# Patient Record
Sex: MALE | Race: WHITE | Employment: FULL TIME | URBAN - METROPOLITAN AREA
[De-identification: names, ages, dates, MRNs, and addresses within clinical notes are randomized per-mention and may not be internally consistent; named-entity substitution may affect disease eponyms.]

---

## 2017-01-13 ENCOUNTER — OFFICE VISIT (OUTPATIENT)
Dept: ENT CLINIC | Age: 37
End: 2017-01-13

## 2017-01-13 VITALS
WEIGHT: 230 LBS | HEART RATE: 103 BPM | SYSTOLIC BLOOD PRESSURE: 130 MMHG | DIASTOLIC BLOOD PRESSURE: 70 MMHG | HEIGHT: 71 IN | BODY MASS INDEX: 32.2 KG/M2

## 2017-01-13 DIAGNOSIS — H60.331 ACUTE SWIMMER'S EAR OF RIGHT SIDE: Primary | ICD-10-CM

## 2017-01-13 DIAGNOSIS — H72.13: ICD-10-CM

## 2017-01-13 DIAGNOSIS — L92.9 GRANULATION TISSUE OF EAR CANAL: ICD-10-CM

## 2017-01-13 DIAGNOSIS — H71.01 CHOLESTEATOMA OF ATTIC OF RIGHT EAR: ICD-10-CM

## 2017-01-13 DIAGNOSIS — H66.13 CHRONIC TUBOTYMPANIC SUPPURATIVE OTITIS MEDIA OF BOTH EARS: ICD-10-CM

## 2017-01-13 PROCEDURE — 99214 OFFICE O/P EST MOD 30 MIN: CPT | Performed by: OTOLARYNGOLOGY

## 2017-01-20 ENCOUNTER — HOSPITAL ENCOUNTER (OUTPATIENT)
Dept: CT IMAGING | Age: 37
Discharge: HOME | End: 2017-01-20
Attending: OTOLARYNGOLOGY | Admitting: OTOLARYNGOLOGY

## 2017-01-20 DIAGNOSIS — L92.9 GRANULATION TISSUE OF EAR CANAL: ICD-10-CM

## 2017-01-20 DIAGNOSIS — H66.13 CHRONIC TUBOTYMPANIC SUPPURATIVE OTITIS MEDIA OF BOTH EARS: ICD-10-CM

## 2017-01-20 DIAGNOSIS — H72.13: ICD-10-CM

## 2017-01-20 DIAGNOSIS — H71.01 CHOLESTEATOMA OF ATTIC OF RIGHT EAR: ICD-10-CM

## 2017-02-07 ENCOUNTER — OFFICE VISIT (OUTPATIENT)
Dept: ENT CLINIC | Age: 37
End: 2017-02-07

## 2017-02-07 VITALS
WEIGHT: 228.6 LBS | HEART RATE: 73 BPM | SYSTOLIC BLOOD PRESSURE: 123 MMHG | BODY MASS INDEX: 31.88 KG/M2 | DIASTOLIC BLOOD PRESSURE: 82 MMHG

## 2017-02-07 DIAGNOSIS — H66.13 CHRONIC TUBOTYMPANIC SUPPURATIVE OTITIS MEDIA OF BOTH EARS: ICD-10-CM

## 2017-02-07 DIAGNOSIS — L92.9 GRANULATION TISSUE OF EAR CANAL: ICD-10-CM

## 2017-02-07 DIAGNOSIS — H91.93 HEARING LOSS, BILATERAL: ICD-10-CM

## 2017-02-07 DIAGNOSIS — H71.01 CHOLESTEATOMA OF ATTIC OF RIGHT EAR: Primary | ICD-10-CM

## 2017-02-07 DIAGNOSIS — H71.21 CHOLESTEATOMA OF MASTOID CAVITY, RIGHT: ICD-10-CM

## 2017-02-07 PROBLEM — H71.20: Status: ACTIVE | Noted: 2017-02-07

## 2017-02-07 PROCEDURE — 99214 OFFICE O/P EST MOD 30 MIN: CPT | Performed by: OTOLARYNGOLOGY

## 2017-02-17 ENCOUNTER — TELEPHONE (OUTPATIENT)
Dept: ENT CLINIC | Age: 37
End: 2017-02-17

## 2017-03-01 ENCOUNTER — TELEPHONE (OUTPATIENT)
Dept: ENT CLINIC | Age: 37
End: 2017-03-01

## 2017-03-10 ENCOUNTER — TELEPHONE (OUTPATIENT)
Dept: ENT CLINIC | Age: 37
End: 2017-03-10

## 2017-03-30 ENCOUNTER — HOSPITAL ENCOUNTER (OUTPATIENT)
Dept: SURGERY | Age: 37
Discharge: HOME | End: 2017-03-30
Attending: OTOLARYNGOLOGY | Admitting: OTOLARYNGOLOGY

## 2017-03-30 VITALS
HEIGHT: 71 IN | TEMPERATURE: 97 F | RESPIRATION RATE: 17 BRPM | HEART RATE: 100 BPM | OXYGEN SATURATION: 97 % | DIASTOLIC BLOOD PRESSURE: 83 MMHG | BODY MASS INDEX: 31.2 KG/M2 | SYSTOLIC BLOOD PRESSURE: 145 MMHG | WEIGHT: 222.88 LBS

## 2017-03-30 LAB
ANION GAP SERPL CALCULATED.3IONS-SCNC: 16 MMOL/L (ref 3–16)
BUN BLDV-MCNC: 14 MG/DL (ref 7–20)
CALCIUM SERPL-MCNC: 9.1 MG/DL (ref 8.3–10.6)
CHLORIDE BLD-SCNC: 98 MMOL/L (ref 99–110)
CO2: 24 MMOL/L (ref 21–32)
CREAT SERPL-MCNC: 0.6 MG/DL (ref 0.9–1.3)
GFR AFRICAN AMERICAN: >60
GFR NON-AFRICAN AMERICAN: >60
GLUCOSE BLD-MCNC: 99 MG/DL (ref 70–99)
HCT VFR BLD CALC: 47.7 % (ref 40.5–52.5)
HEMOGLOBIN: 16.4 G/DL (ref 13.5–17.5)
MCH RBC QN AUTO: 30.5 PG (ref 26–34)
MCHC RBC AUTO-ENTMCNC: 34.4 G/DL (ref 31–36)
MCV RBC AUTO: 88.8 FL (ref 80–100)
PDW BLD-RTO: 12.9 % (ref 12.4–15.4)
PLATELET # BLD: 187 K/UL (ref 135–450)
PMV BLD AUTO: 8.7 FL (ref 5–10.5)
POTASSIUM SERPL-SCNC: 4 MMOL/L (ref 3.5–5.1)
RBC # BLD: 5.37 M/UL (ref 4.2–5.9)
SODIUM BLD-SCNC: 138 MMOL/L (ref 136–145)
WBC # BLD: 7.5 K/UL (ref 4–11)

## 2017-03-30 PROCEDURE — 93010 ELECTROCARDIOGRAM REPORT: CPT | Performed by: INTERNAL MEDICINE

## 2017-03-30 PROCEDURE — 69641 REVISE MIDDLE EAR & MASTOID: CPT | Performed by: OTOLARYNGOLOGY

## 2017-03-30 RX ORDER — SODIUM CHLORIDE 9 MG/ML
INJECTION, SOLUTION INTRAVENOUS CONTINUOUS
Status: DISCONTINUED | OUTPATIENT
Start: 2017-03-30 | End: 2017-03-31 | Stop reason: HOSPADM

## 2017-03-30 RX ORDER — SODIUM CHLORIDE, SODIUM LACTATE, POTASSIUM CHLORIDE, CALCIUM CHLORIDE 600; 310; 30; 20 MG/100ML; MG/100ML; MG/100ML; MG/100ML
INJECTION, SOLUTION INTRAVENOUS CONTINUOUS
Status: DISCONTINUED | OUTPATIENT
Start: 2017-03-30 | End: 2017-03-31 | Stop reason: HOSPADM

## 2017-03-30 RX ORDER — FENTANYL CITRATE 50 UG/ML
25 INJECTION, SOLUTION INTRAMUSCULAR; INTRAVENOUS EVERY 5 MIN PRN
Status: DISCONTINUED | OUTPATIENT
Start: 2017-03-30 | End: 2017-03-31 | Stop reason: HOSPADM

## 2017-03-30 RX ORDER — CEFUROXIME AXETIL 250 MG/1
250 TABLET ORAL 2 TIMES DAILY
Qty: 20 TABLET | Refills: 0 | Status: SHIPPED | OUTPATIENT
Start: 2017-03-30 | End: 2017-04-09

## 2017-03-30 RX ORDER — ONDANSETRON 2 MG/ML
4 INJECTION INTRAMUSCULAR; INTRAVENOUS
Status: COMPLETED | OUTPATIENT
Start: 2017-03-30 | End: 2017-03-30

## 2017-03-30 RX ORDER — HYDROCODONE BITARTRATE AND ACETAMINOPHEN 5; 325 MG/1; MG/1
TABLET ORAL
Qty: 80 TABLET | Refills: 0 | Status: SHIPPED | OUTPATIENT
Start: 2017-03-30 | End: 2017-04-17

## 2017-03-30 RX ORDER — LIDOCAINE HYDROCHLORIDE 10 MG/ML
0.5 INJECTION, SOLUTION EPIDURAL; INFILTRATION; INTRACAUDAL; PERINEURAL ONCE
Status: DISCONTINUED | OUTPATIENT
Start: 2017-03-30 | End: 2017-03-31 | Stop reason: HOSPADM

## 2017-03-30 RX ORDER — CEFAZOLIN SODIUM 2 G/100ML
2 INJECTION, SOLUTION INTRAVENOUS
Status: COMPLETED | OUTPATIENT
Start: 2017-03-30 | End: 2017-03-30

## 2017-03-30 RX ORDER — PROMETHAZINE HYDROCHLORIDE 25 MG/1
25 TABLET ORAL EVERY 6 HOURS PRN
Qty: 40 TABLET | Refills: 0 | Status: SHIPPED | OUTPATIENT
Start: 2017-03-30 | End: 2017-04-17

## 2017-03-30 RX ORDER — SODIUM CHLORIDE 0.9 % (FLUSH) 0.9 %
10 SYRINGE (ML) INJECTION PRN
Status: DISCONTINUED | OUTPATIENT
Start: 2017-03-30 | End: 2017-03-31 | Stop reason: HOSPADM

## 2017-03-30 RX ORDER — MORPHINE SULFATE 2 MG/ML
1 INJECTION, SOLUTION INTRAMUSCULAR; INTRAVENOUS EVERY 5 MIN PRN
Status: DISCONTINUED | OUTPATIENT
Start: 2017-03-30 | End: 2017-03-31 | Stop reason: HOSPADM

## 2017-03-30 RX ORDER — LABETALOL HYDROCHLORIDE 5 MG/ML
5 INJECTION, SOLUTION INTRAVENOUS ONCE
Status: COMPLETED | OUTPATIENT
Start: 2017-03-30 | End: 2017-03-30

## 2017-03-30 RX ORDER — HYDROMORPHONE HCL 110MG/55ML
0.5 PATIENT CONTROLLED ANALGESIA SYRINGE INTRAVENOUS EVERY 5 MIN PRN
Status: DISCONTINUED | OUTPATIENT
Start: 2017-03-30 | End: 2017-03-31 | Stop reason: HOSPADM

## 2017-03-30 RX ORDER — DIPHENHYDRAMINE HYDROCHLORIDE 50 MG/ML
25 INJECTION INTRAMUSCULAR; INTRAVENOUS PRN
Status: DISCONTINUED | OUTPATIENT
Start: 2017-03-30 | End: 2017-03-31 | Stop reason: HOSPADM

## 2017-03-30 RX ORDER — LABETALOL HYDROCHLORIDE 5 MG/ML
INJECTION, SOLUTION INTRAVENOUS
Status: DISCONTINUED
Start: 2017-03-30 | End: 2017-03-31 | Stop reason: HOSPADM

## 2017-03-30 RX ORDER — SODIUM CHLORIDE 0.9 % (FLUSH) 0.9 %
10 SYRINGE (ML) INJECTION EVERY 12 HOURS SCHEDULED
Status: DISCONTINUED | OUTPATIENT
Start: 2017-03-30 | End: 2017-03-31 | Stop reason: HOSPADM

## 2017-03-30 RX ADMIN — Medication 0.5 MG: at 14:50

## 2017-03-30 RX ADMIN — ONDANSETRON 4 MG: 2 INJECTION INTRAMUSCULAR; INTRAVENOUS at 16:13

## 2017-03-30 RX ADMIN — Medication 0.5 MG: at 14:26

## 2017-03-30 RX ADMIN — LABETALOL HYDROCHLORIDE 5 MG: 5 INJECTION, SOLUTION INTRAVENOUS at 15:15

## 2017-03-30 RX ADMIN — CEFAZOLIN SODIUM 2 G: 2 INJECTION, SOLUTION INTRAVENOUS at 07:26

## 2017-03-30 RX ADMIN — FENTANYL CITRATE 25 MCG: 50 INJECTION, SOLUTION INTRAMUSCULAR; INTRAVENOUS at 15:05

## 2017-03-30 ASSESSMENT — PAIN DESCRIPTION - ORIENTATION
ORIENTATION: RIGHT

## 2017-03-30 ASSESSMENT — PAIN SCALES - GENERAL
PAINLEVEL_OUTOF10: 4
PAINLEVEL_OUTOF10: 8
PAINLEVEL_OUTOF10: 5
PAINLEVEL_OUTOF10: 3
PAINLEVEL_OUTOF10: 7

## 2017-03-30 ASSESSMENT — PAIN DESCRIPTION - PAIN TYPE
TYPE: SURGICAL PAIN

## 2017-03-30 ASSESSMENT — PAIN DESCRIPTION - LOCATION
LOCATION: EAR

## 2017-03-30 ASSESSMENT — PAIN - FUNCTIONAL ASSESSMENT: PAIN_FUNCTIONAL_ASSESSMENT: 0-10

## 2017-03-31 LAB
EKG ATRIAL RATE: 95 BPM
EKG DIAGNOSIS: NORMAL
EKG P AXIS: 37 DEGREES
EKG P-R INTERVAL: 148 MS
EKG Q-T INTERVAL: 362 MS
EKG QRS DURATION: 88 MS
EKG QTC CALCULATION (BAZETT): 454 MS
EKG R AXIS: -29 DEGREES
EKG T AXIS: 19 DEGREES
EKG VENTRICULAR RATE: 95 BPM

## 2017-04-05 ENCOUNTER — OFFICE VISIT (OUTPATIENT)
Dept: ENT CLINIC | Age: 37
End: 2017-04-05

## 2017-04-05 VITALS
BODY MASS INDEX: 31.08 KG/M2 | SYSTOLIC BLOOD PRESSURE: 119 MMHG | WEIGHT: 222 LBS | HEIGHT: 71 IN | DIASTOLIC BLOOD PRESSURE: 78 MMHG | HEART RATE: 89 BPM

## 2017-04-05 DIAGNOSIS — Z09 POSTOP CHECK: Primary | ICD-10-CM

## 2017-04-05 PROCEDURE — 99024 POSTOP FOLLOW-UP VISIT: CPT | Performed by: OTOLARYNGOLOGY

## 2017-04-07 ENCOUNTER — OFFICE VISIT (OUTPATIENT)
Dept: ENT CLINIC | Age: 37
End: 2017-04-07

## 2017-04-07 VITALS
WEIGHT: 228.1 LBS | BODY MASS INDEX: 31.81 KG/M2 | SYSTOLIC BLOOD PRESSURE: 127 MMHG | DIASTOLIC BLOOD PRESSURE: 78 MMHG | HEART RATE: 83 BPM

## 2017-04-07 DIAGNOSIS — Z09 POSTOP CHECK: Primary | ICD-10-CM

## 2017-04-07 PROCEDURE — 99024 POSTOP FOLLOW-UP VISIT: CPT | Performed by: OTOLARYNGOLOGY

## 2017-04-07 RX ORDER — CIPROFLOXACIN AND DEXAMETHASONE 3; 1 MG/ML; MG/ML
6 SUSPENSION/ DROPS AURICULAR (OTIC) 2 TIMES DAILY
Qty: 1 BOTTLE | Refills: 1 | Status: SHIPPED | OUTPATIENT
Start: 2017-04-07 | End: 2018-06-15 | Stop reason: SDUPTHER

## 2017-04-11 ENCOUNTER — TELEPHONE (OUTPATIENT)
Dept: ENT CLINIC | Age: 37
End: 2017-04-11

## 2017-04-17 ENCOUNTER — OFFICE VISIT (OUTPATIENT)
Dept: ENT CLINIC | Age: 37
End: 2017-04-17

## 2017-04-17 VITALS
WEIGHT: 228 LBS | BODY MASS INDEX: 31.92 KG/M2 | HEIGHT: 71 IN | HEART RATE: 88 BPM | SYSTOLIC BLOOD PRESSURE: 127 MMHG | DIASTOLIC BLOOD PRESSURE: 74 MMHG

## 2017-04-17 DIAGNOSIS — Z09 POSTOP CHECK: Primary | ICD-10-CM

## 2017-04-17 PROCEDURE — 99024 POSTOP FOLLOW-UP VISIT: CPT | Performed by: OTOLARYNGOLOGY

## 2017-04-28 ENCOUNTER — OFFICE VISIT (OUTPATIENT)
Dept: ENT CLINIC | Age: 37
End: 2017-04-28

## 2017-04-28 VITALS
BODY MASS INDEX: 31.92 KG/M2 | DIASTOLIC BLOOD PRESSURE: 72 MMHG | HEART RATE: 87 BPM | WEIGHT: 228 LBS | SYSTOLIC BLOOD PRESSURE: 126 MMHG | HEIGHT: 71 IN

## 2017-04-28 DIAGNOSIS — Z09 POSTOP CHECK: Primary | ICD-10-CM

## 2017-04-28 PROCEDURE — 99024 POSTOP FOLLOW-UP VISIT: CPT | Performed by: OTOLARYNGOLOGY

## 2017-05-12 ENCOUNTER — OFFICE VISIT (OUTPATIENT)
Dept: ENT CLINIC | Age: 37
End: 2017-05-12

## 2017-05-12 VITALS
HEIGHT: 71 IN | BODY MASS INDEX: 31.92 KG/M2 | HEART RATE: 84 BPM | WEIGHT: 228 LBS | SYSTOLIC BLOOD PRESSURE: 125 MMHG | DIASTOLIC BLOOD PRESSURE: 72 MMHG

## 2017-05-12 DIAGNOSIS — Z09 POSTOP CHECK: Primary | ICD-10-CM

## 2017-05-12 PROCEDURE — 99024 POSTOP FOLLOW-UP VISIT: CPT | Performed by: OTOLARYNGOLOGY

## 2017-08-11 ENCOUNTER — OFFICE VISIT (OUTPATIENT)
Dept: ENT CLINIC | Age: 37
End: 2017-08-11

## 2017-08-11 VITALS
HEART RATE: 105 BPM | HEIGHT: 71 IN | WEIGHT: 226.9 LBS | DIASTOLIC BLOOD PRESSURE: 71 MMHG | SYSTOLIC BLOOD PRESSURE: 123 MMHG | BODY MASS INDEX: 31.77 KG/M2

## 2017-08-11 DIAGNOSIS — H71.21 CHOLESTEATOMA OF MASTOID CAVITY, RIGHT: ICD-10-CM

## 2017-08-11 DIAGNOSIS — H95.191 ENCOUNTER FOR MASTOIDECTOMY CAVITY DEBRIDEMENT, RIGHT: ICD-10-CM

## 2017-08-11 DIAGNOSIS — H71.01 CHOLESTEATOMA OF ATTIC OF RIGHT EAR: Primary | ICD-10-CM

## 2017-08-11 PROCEDURE — 69220 CLEAN OUT MASTOID CAVITY: CPT | Performed by: OTOLARYNGOLOGY

## 2017-12-08 ENCOUNTER — OFFICE VISIT (OUTPATIENT)
Dept: ENT CLINIC | Age: 37
End: 2017-12-08

## 2017-12-08 VITALS
BODY MASS INDEX: 32.03 KG/M2 | DIASTOLIC BLOOD PRESSURE: 90 MMHG | HEART RATE: 90 BPM | HEIGHT: 71 IN | SYSTOLIC BLOOD PRESSURE: 144 MMHG | WEIGHT: 228.8 LBS

## 2017-12-08 DIAGNOSIS — H95.191 ENCOUNTER FOR MASTOIDECTOMY CAVITY DEBRIDEMENT, RIGHT: ICD-10-CM

## 2017-12-08 DIAGNOSIS — H72.12 ATTIC PERFORATION OF TYMPANIC MEMBRANE OF LEFT EAR: ICD-10-CM

## 2017-12-08 DIAGNOSIS — H66.12 CHRONIC TUBOTYMPANIC SUPPURATIVE OTITIS MEDIA OF LEFT EAR: ICD-10-CM

## 2017-12-08 DIAGNOSIS — H70.11 CHRONIC MASTOIDITIS OF RIGHT SIDE: Primary | ICD-10-CM

## 2017-12-08 PROCEDURE — 69220 CLEAN OUT MASTOID CAVITY: CPT | Performed by: OTOLARYNGOLOGY

## 2017-12-14 NOTE — PROGRESS NOTES
[address] [place] [address] ENT      HISTORY:  Hannah Hendrix returns today for mastoid bowl cleaning. He stated he has had no recent ear pain or drainage. His hearing is as usual.        PROCEDURE (39920):  Debridement of the right mastoid cavity, was performed under otomicroscopic visualization, removing copious squamous debris and cerumen impaction. There is no evidence of recurrent cholesteatoma. On the left, there was a severe attic retraction pocket with scutum erosion, exposing the head of the malleus. The incus appeared to be eroded and absent. There was no evidence of cholesteatoma. IMPRESSION / Daryl Huffman / Noé Narvaez / PROCEDURES:       Hannah Hendrix was seen today for ear problem. Diagnoses and all orders for this visit:    Chronic mastoiditis of right side    Encounter for mastoidectomy cavity debridement, right    Chronic tubotympanic suppurative otitis media of left ear    Attic perforation of tympanic membrane of left ear      RECOMMENDATIONS / PLAN:    Return in about 6 months (around 6/8/2018) for mastoid debridment, recheck/follow-up, and sooner if condition worsens.

## 2018-06-12 ENCOUNTER — TELEPHONE (OUTPATIENT)
Dept: ENT CLINIC | Age: 38
End: 2018-06-12

## 2018-06-15 ENCOUNTER — OFFICE VISIT (OUTPATIENT)
Dept: ENT CLINIC | Age: 38
End: 2018-06-15

## 2018-06-15 VITALS
HEART RATE: 75 BPM | SYSTOLIC BLOOD PRESSURE: 120 MMHG | WEIGHT: 225 LBS | DIASTOLIC BLOOD PRESSURE: 83 MMHG | BODY MASS INDEX: 31.38 KG/M2

## 2018-06-15 DIAGNOSIS — H60.332 ACUTE SWIMMER'S EAR OF LEFT SIDE: ICD-10-CM

## 2018-06-15 DIAGNOSIS — H66.012 ACUTE SUPPURATIVE OTITIS MEDIA OF LEFT EAR WITH SPONTANEOUS RUPTURE OF TYMPANIC MEMBRANE, RECURRENCE NOT SPECIFIED: Primary | ICD-10-CM

## 2018-06-15 PROCEDURE — 99214 OFFICE O/P EST MOD 30 MIN: CPT | Performed by: OTOLARYNGOLOGY

## 2018-06-15 PROCEDURE — 92504 EAR MICROSCOPY EXAMINATION: CPT | Performed by: OTOLARYNGOLOGY

## 2018-06-15 RX ORDER — CIPROFLOXACIN AND DEXAMETHASONE 3; 1 MG/ML; MG/ML
4 SUSPENSION/ DROPS AURICULAR (OTIC) 2 TIMES DAILY
Qty: 7.5 ML | Refills: 0 | COMMUNITY
Start: 2018-06-15 | End: 2018-06-25

## 2018-06-15 RX ORDER — AMOXICILLIN AND CLAVULANATE POTASSIUM 875; 125 MG/1; MG/1
1 TABLET, FILM COATED ORAL 2 TIMES DAILY
Qty: 20 TABLET | Refills: 0 | Status: SHIPPED | OUTPATIENT
Start: 2018-06-15 | End: 2018-06-25

## 2018-07-10 ENCOUNTER — OFFICE VISIT (OUTPATIENT)
Dept: ENT CLINIC | Age: 38
End: 2018-07-10

## 2018-07-10 VITALS
BODY MASS INDEX: 32.06 KG/M2 | WEIGHT: 229 LBS | HEART RATE: 97 BPM | DIASTOLIC BLOOD PRESSURE: 82 MMHG | SYSTOLIC BLOOD PRESSURE: 132 MMHG | HEIGHT: 71 IN

## 2018-07-10 DIAGNOSIS — H60.332 ACUTE SWIMMER'S EAR OF LEFT SIDE: ICD-10-CM

## 2018-07-10 DIAGNOSIS — H66.13 CHRONIC TUBOTYMPANIC SUPPURATIVE OTITIS MEDIA OF BOTH EARS: ICD-10-CM

## 2018-07-10 DIAGNOSIS — H72.12 ATTIC PERFORATION OF TYMPANIC MEMBRANE OF LEFT EAR: ICD-10-CM

## 2018-07-10 DIAGNOSIS — H66.012 ACUTE SUPPURATIVE OTITIS MEDIA OF LEFT EAR WITH SPONTANEOUS RUPTURE OF TYMPANIC MEMBRANE, RECURRENCE NOT SPECIFIED: Primary | ICD-10-CM

## 2018-07-10 DIAGNOSIS — H70.11 CHRONIC MASTOIDITIS OF RIGHT SIDE: ICD-10-CM

## 2018-07-10 DIAGNOSIS — H95.191 POST MASTOIDECTOMY SEQUELAE, RIGHT: ICD-10-CM

## 2018-07-10 PROCEDURE — 99213 OFFICE O/P EST LOW 20 MIN: CPT | Performed by: OTOLARYNGOLOGY

## 2018-07-10 PROCEDURE — 92504 EAR MICROSCOPY EXAMINATION: CPT | Performed by: OTOLARYNGOLOGY

## 2019-01-11 ENCOUNTER — OFFICE VISIT (OUTPATIENT)
Dept: ENT CLINIC | Age: 39
End: 2019-01-11
Payer: COMMERCIAL

## 2019-01-11 VITALS
HEART RATE: 70 BPM | SYSTOLIC BLOOD PRESSURE: 118 MMHG | WEIGHT: 221 LBS | BODY MASS INDEX: 30.94 KG/M2 | DIASTOLIC BLOOD PRESSURE: 78 MMHG | HEIGHT: 71 IN

## 2019-01-11 DIAGNOSIS — H70.11 CHRONIC MASTOIDITIS OF RIGHT SIDE: ICD-10-CM

## 2019-01-11 DIAGNOSIS — H95.191 POST MASTOIDECTOMY SEQUELAE, RIGHT: ICD-10-CM

## 2019-01-11 DIAGNOSIS — Z86.69 HISTORY OF CHOLESTEATOMA: ICD-10-CM

## 2019-01-11 DIAGNOSIS — H95.191 ENCOUNTER FOR MASTOIDECTOMY CAVITY DEBRIDEMENT, RIGHT: ICD-10-CM

## 2019-01-11 PROCEDURE — 69220 CLEAN OUT MASTOID CAVITY: CPT | Performed by: OTOLARYNGOLOGY

## 2019-07-12 ENCOUNTER — OFFICE VISIT (OUTPATIENT)
Dept: ENT CLINIC | Age: 39
End: 2019-07-12
Payer: COMMERCIAL

## 2019-07-12 VITALS
DIASTOLIC BLOOD PRESSURE: 77 MMHG | BODY MASS INDEX: 31.52 KG/M2 | WEIGHT: 226 LBS | HEART RATE: 69 BPM | SYSTOLIC BLOOD PRESSURE: 116 MMHG

## 2019-07-12 DIAGNOSIS — H70.11 CHRONIC MASTOIDITIS OF RIGHT SIDE: ICD-10-CM

## 2019-07-12 DIAGNOSIS — H95.191 ENCOUNTER FOR MASTOIDECTOMY CAVITY DEBRIDEMENT, RIGHT: ICD-10-CM

## 2019-07-12 DIAGNOSIS — H61.21 IMPACTED CERUMEN OF RIGHT EAR: ICD-10-CM

## 2019-07-12 DIAGNOSIS — Z86.69 HISTORY OF CHOLESTEATOMA: ICD-10-CM

## 2019-07-12 PROCEDURE — 69220 CLEAN OUT MASTOID CAVITY: CPT | Performed by: OTOLARYNGOLOGY

## 2020-01-13 ENCOUNTER — OFFICE VISIT (OUTPATIENT)
Dept: ENT CLINIC | Age: 40
End: 2020-01-13
Payer: COMMERCIAL

## 2020-01-13 VITALS
SYSTOLIC BLOOD PRESSURE: 137 MMHG | BODY MASS INDEX: 30.85 KG/M2 | DIASTOLIC BLOOD PRESSURE: 82 MMHG | WEIGHT: 220.4 LBS | HEIGHT: 71 IN | HEART RATE: 68 BPM

## 2020-01-13 PROCEDURE — 69220 CLEAN OUT MASTOID CAVITY: CPT | Performed by: OTOLARYNGOLOGY

## 2020-01-13 PROCEDURE — 69210 REMOVE IMPACTED EAR WAX UNI: CPT | Performed by: OTOLARYNGOLOGY

## 2020-03-13 ENCOUNTER — OFFICE VISIT (OUTPATIENT)
Dept: ENT CLINIC | Age: 40
End: 2020-03-13
Payer: COMMERCIAL

## 2020-03-13 VITALS
SYSTOLIC BLOOD PRESSURE: 119 MMHG | BODY MASS INDEX: 30.8 KG/M2 | DIASTOLIC BLOOD PRESSURE: 80 MMHG | WEIGHT: 220 LBS | HEART RATE: 64 BPM | HEIGHT: 71 IN

## 2020-03-13 PROCEDURE — 99214 OFFICE O/P EST MOD 30 MIN: CPT | Performed by: OTOLARYNGOLOGY

## 2020-03-13 RX ORDER — AZITHROMYCIN 250 MG/1
TABLET, FILM COATED ORAL
Qty: 1 PACKET | Refills: 0 | Status: SHIPPED | OUTPATIENT
Start: 2020-03-13 | End: 2020-07-14 | Stop reason: ALTCHOICE

## 2020-03-13 NOTE — PROGRESS NOTES
tracheal position. PALPATION OF LYMPH NODES, CERVICAL, FACIAL AND SUPRACLAVICULAR:  No lymphadenopathy. IMPRESSION / Jenny Frida / Deysi Maurice / PROCEDURES:       Mike Martines was seen today for pharyngitis and sinusitis. Diagnoses and all orders for this visit:    Acute tonsillitis, unspecified etiology  -     azithromycin (ZITHROMAX Z-MARIAJOSE) 250 MG tablet; Take, by mouth, two tablets on day 1, then 1 tablet on day 2 to 5. Acute non-recurrent maxillary sinusitis  -     azithromycin (ZITHROMAX Z-MARIAJOSE) 250 MG tablet; Take, by mouth, two tablets on day 1, then 1 tablet on day 2 to 5. RECOMMENDATIONS / PLAN:      Patient Instructions   RHINOSINUSITIS CARE  · Take Probiotic while you are taking antibiotics, to prevent diarrhea, stomach upset, pseudomembranous colitis, and C. difficile diarrhea. This may be obtained at your pharmacy or health food store. Alternatively, you may eat one cup of yogurt with active or live cultures twice daily while taking the antibiotic. Continue for two to three days after completion of the antibiotic. · Use a 12 hour decongestant spray, 0.05% oxymetazoline (e.g. Afrin, Duration, 4-Way). Spray each nostril twice three times a day for three days, then two times a day for 2 days, and then stop for two days and then repeat the cycle once. · Take one Mucinex-D (red orange box) maximum strength tablet each morning and one Mucinex (blue box) maximum strength tablet each evening, about 12 hours later, daily for the next ten days. Take only is approved by your PCP regarding high blood pressure. · It may take several days to several weeks for your sinusitis to clear up. It is important to take all your medications as prescribed. Please continue your antibiotics as prescribed.     · Please call the office if your condition worsens or if symptoms persist after treatment is completed.        ===================================================================      ADVERSE AND SIDE EFFECTS OF MEDICATIONS:    Please be aware of the following possible adverse reactions, side effects, and complications of the following medications, including, but not limited to: allergic reaction, interactions with other medications, nausea, headache, diarrhea, persistent symptoms, failure to improve, and the following:     Z-kyle (azithromycin)  (antibiotic):  diarrhea, colitis (severe infection and inflammation of the large intestine), pseudomembranous colitis (severe infection and inflammation of the colon, usually due to C. difficile bacteria)  yeast or fungal  infections, Rivas-Roger syndrome (very rare necrotic skin reaction with peeling of skin, blisters and arthritis), persistent symptoms/infection, and failure to improve.       ~~~>>> Also please read all information given to you by the pharmacist.           Follow-up  Return if symptoms worsen or fail to clear 10 days after staring Z-kyle, or on 7/13/20..        >>> Please note that this note was completed using Dragon voice recognition transcription. Every effort was made to ensure accuracy; however, inadvertent  transcription errors may be present despite my best efforts to edit errors.

## 2020-07-14 ENCOUNTER — OFFICE VISIT (OUTPATIENT)
Dept: ENT CLINIC | Age: 40
End: 2020-07-14
Payer: COMMERCIAL

## 2020-07-14 VITALS
HEART RATE: 78 BPM | WEIGHT: 220 LBS | TEMPERATURE: 98 F | HEIGHT: 71 IN | BODY MASS INDEX: 30.8 KG/M2 | SYSTOLIC BLOOD PRESSURE: 130 MMHG | DIASTOLIC BLOOD PRESSURE: 88 MMHG

## 2020-07-14 PROCEDURE — 69220 CLEAN OUT MASTOID CAVITY: CPT | Performed by: OTOLARYNGOLOGY

## 2020-07-14 NOTE — PROGRESS NOTES
No issues. [address] [place] [address] ENT    HISTORY:  Pantera Romo stated that the right ear is plugged up and needs a mastoid debridement. The left ear is doing well with no problems. There is no pain or drainage from either ear. PROCEDURE ([id number]):  Debridement of the right mastoid cavity, was performed under otomicroscopic visualization, removing copious squamous debris and cerumen impaction, using a Cox suction, alligator forceps, and wire loop. He stated that the debrided ear felt back to usual.      Mild non-obstructive cerumen impaction was removed from the left EAC with Billeau wire loop. The left TM was dull, thick, opaque and nonerythematous. The attic retraction pocket was clear with no cholesteatoma. The left EAC was otherwise normal.          IMPRESSION / Demetrin Goodell / Dani Gallagher / PROCEDURES:       Diagnoses and all orders for this visit:    Chronic mastoiditis of right side    Encounter for mastoidectomy cavity debridement, right    Impacted cerumen of left ear    Post mastoidectomy sequelae, right    History of cholesteatoma         RECOMMENDATIONS / PLAN:   1. Keep ears dry. Water precautions reviewed. 2. Return in about 6 months (around 1/14/2021) for recheck/follow-up, mastoid debridement, and sooner if condition worsens.

## 2021-01-14 ENCOUNTER — OFFICE VISIT (OUTPATIENT)
Dept: ENT CLINIC | Age: 41
End: 2021-01-14
Payer: COMMERCIAL

## 2021-01-14 VITALS
TEMPERATURE: 97.5 F | SYSTOLIC BLOOD PRESSURE: 141 MMHG | RESPIRATION RATE: 16 BRPM | DIASTOLIC BLOOD PRESSURE: 90 MMHG | WEIGHT: 228 LBS | HEIGHT: 71 IN | HEART RATE: 97 BPM | BODY MASS INDEX: 31.92 KG/M2

## 2021-01-14 DIAGNOSIS — H72.92 PERFORATION OF LEFT TYMPANIC MEMBRANE: ICD-10-CM

## 2021-01-14 DIAGNOSIS — H66.13 CHRONIC TUBOTYMPANIC SUPPURATIVE OTITIS MEDIA OF BOTH EARS: Chronic | ICD-10-CM

## 2021-01-14 DIAGNOSIS — H95.191 ENCOUNTER FOR MASTOIDECTOMY CAVITY DEBRIDEMENT, RIGHT: ICD-10-CM

## 2021-01-14 DIAGNOSIS — H90.A12 CONDUCTIVE HEARING LOSS OF LEFT EAR WITH RESTRICTED HEARING OF RIGHT EAR: ICD-10-CM

## 2021-01-14 DIAGNOSIS — H95.191 POST MASTOIDECTOMY SEQUELAE, RIGHT: ICD-10-CM

## 2021-01-14 DIAGNOSIS — H61.22 IMPACTED CERUMEN OF LEFT EAR: ICD-10-CM

## 2021-01-14 DIAGNOSIS — H70.11 CHRONIC MASTOIDITIS OF RIGHT SIDE: Primary | ICD-10-CM

## 2021-01-14 PROCEDURE — 69210 REMOVE IMPACTED EAR WAX UNI: CPT | Performed by: OTOLARYNGOLOGY

## 2021-01-14 PROCEDURE — 69220 CLEAN OUT MASTOID CAVITY: CPT | Performed by: OTOLARYNGOLOGY

## 2021-01-14 NOTE — PROGRESS NOTES
[facility] ENT    HISTORY  Timoteo Jacob stated that the right mastoid bowl cleaning. He thinks the left ear also needs cleaning. He wears a hearing aid in the right ear but still does not hear well in that ear. He wears a hearing aid in left ear which helps. PROCEDURE #1 - DEBRIDEMENT RIGHT MASTOID CAVITY ([id number])  Debridement of the right mastoid cavity, was performed under otomicroscopic visualization, removing copious squamous debris and cerumen impaction. There were postsurgical changes of the right ear consistent with modified radical mastoidectomy. Tympanic membrane appeared to be intact. PROCEDURE - REMOVAL OF LEFT CERUMEN IMPACTION ([id number])  Obstructing cerumen impaction occluding the left EAC, and obscuring visualization of the TM, was removed under otomicroscopic visualization, with instrumentation, using a Billeau wire loop and suction. There was a posterior superior perforation of the left tympanic membrane overlying the incudostapedial joint and oval window. There was no evidence of cholesteatoma. This included a attic retraction pocket/perforation on the left. The remainder the left tympanic membrane appeared to be normal with no erythema. There is no middle ear effusion or exudate. IMPRESSION / Chuck Dandy / Therese Corpus / PROCEDURES:       Timoteo Jacob was seen today for ear problem. Diagnoses and all orders for this visit:    Chronic mastoiditis of right side    Encounter for mastoidectomy cavity debridement, right    Post mastoidectomy sequelae, right    Impacted cerumen of left ear    Conductive hearing loss of left ear with restricted hearing of right ear    Chronic tubotympanic suppurative otitis media of both ears    Perforation of left tympanic membrane           RECOMMENDATIONS / PLAN:   1. See Patient Instructions on file for this visit.   2. Return in about 6 months (around 7/14/2021) for right mastoid debridement, recheck/follow-up, and sooner if

## 2021-07-14 ENCOUNTER — OFFICE VISIT (OUTPATIENT)
Dept: ENT CLINIC | Age: 41
End: 2021-07-14
Payer: COMMERCIAL

## 2021-07-14 VITALS — SYSTOLIC BLOOD PRESSURE: 135 MMHG | TEMPERATURE: 97.3 F | HEART RATE: 56 BPM | DIASTOLIC BLOOD PRESSURE: 83 MMHG

## 2021-07-14 DIAGNOSIS — H61.22 IMPACTED CERUMEN OF LEFT EAR: ICD-10-CM

## 2021-07-14 DIAGNOSIS — H70.11 CHRONIC MASTOIDITIS OF RIGHT SIDE: Primary | ICD-10-CM

## 2021-07-14 DIAGNOSIS — H90.A12 CONDUCTIVE HEARING LOSS OF LEFT EAR WITH RESTRICTED HEARING OF RIGHT EAR: ICD-10-CM

## 2021-07-14 DIAGNOSIS — Z86.69 HISTORY OF CHOLESTEATOMA: ICD-10-CM

## 2021-07-14 DIAGNOSIS — H95.191 POST MASTOIDECTOMY SEQUELAE, RIGHT: ICD-10-CM

## 2021-07-14 DIAGNOSIS — H72.92 PERFORATION OF LEFT TYMPANIC MEMBRANE: ICD-10-CM

## 2021-07-14 DIAGNOSIS — H66.13 CHRONIC TUBOTYMPANIC SUPPURATIVE OTITIS MEDIA OF BOTH EARS: ICD-10-CM

## 2021-07-14 DIAGNOSIS — H95.191 ENCOUNTER FOR MASTOIDECTOMY CAVITY DEBRIDEMENT, RIGHT: ICD-10-CM

## 2021-07-14 PROCEDURE — 69220 CLEAN OUT MASTOID CAVITY: CPT | Performed by: OTOLARYNGOLOGY

## 2021-07-14 PROCEDURE — 69210 REMOVE IMPACTED EAR WAX UNI: CPT | Performed by: OTOLARYNGOLOGY

## 2021-07-14 NOTE — PROGRESS NOTES
[address] [facility] ENT    HISTORY  Clyde Chavira stated that the right mastoid bowl needs cleaning. He thinks the left ear also needs cleaning. PROCEDURE #1 - DEBRIDEMENT RIGHT MASTOID CAVITY ([id number])  Debridement of the right mastoid cavity, was performed under otomicroscopic visualization, removing copious squamous debris and cerumen impaction. There were postsurgical changes of the right ear consistent with modified radical mastoidectomy. Tympanic membrane appeared to be intact. PROCEDURE - REMOVAL OF LEFT CERUMEN IMPACTION ([id number])  Obstructing cerumen impaction occluding the left EAC, and obscuring visualization of the TM, was removed under otomicroscopic visualization, with instrumentation, using a Billeau wire loop and suction. There was a posterior superior perforation of the left tympanic membrane overlying the incudostapedial joint and oval window. There was no evidence of cholesteatoma. This included a attic retraction pocket/perforation on the left. The remainder the left tympanic membrane appeared to be normal with no erythema. There is no middle ear effusion or exudate. Gt Pew / Tamera Counter / [address] / PROCEDURES:       Clyde Chavira was seen today for follow-up. Diagnoses and all orders for this visit:    Chronic mastoiditis of right side    Encounter for mastoidectomy cavity debridement, right    Post mastoidectomy sequelae, right    Impacted cerumen of left ear    Conductive hearing loss of left ear with restricted hearing of right ear    Chronic tubotympanic suppurative otitis media of both ears    Perforation of left tympanic membrane    History of cholesteatoma           RECOMMENDATIONS / PLAN:   1. See Patient Instructions on file for this visit. 2. Return in about 6 months (around 1/14/2022) for right mastoid debridement and cleaning of left ear, and sooner if condition worsens.

## 2021-10-14 ENCOUNTER — OFFICE VISIT (OUTPATIENT)
Dept: ENT CLINIC | Age: 41
End: 2021-10-14
Payer: COMMERCIAL

## 2021-10-14 VITALS — DIASTOLIC BLOOD PRESSURE: 94 MMHG | HEART RATE: 65 BPM | SYSTOLIC BLOOD PRESSURE: 152 MMHG | TEMPERATURE: 97.3 F

## 2021-10-14 DIAGNOSIS — H66.015 RECURRENT ACUTE SUPPURATIVE OTITIS MEDIA WITH SPONTANEOUS RUPTURE OF LEFT TYMPANIC MEMBRANE: Primary | ICD-10-CM

## 2021-10-14 DIAGNOSIS — H60.332 ACUTE SWIMMER'S EAR OF LEFT SIDE: ICD-10-CM

## 2021-10-14 DIAGNOSIS — H66.13 CHRONIC TUBOTYMPANIC SUPPURATIVE OTITIS MEDIA OF BOTH EARS: Chronic | ICD-10-CM

## 2021-10-14 DIAGNOSIS — H72.92 PERFORATION OF LEFT TYMPANIC MEMBRANE: ICD-10-CM

## 2021-10-14 PROCEDURE — 99213 OFFICE O/P EST LOW 20 MIN: CPT | Performed by: OTOLARYNGOLOGY

## 2021-10-14 RX ORDER — CIPROFLOXACIN AND DEXAMETHASONE 3; 1 MG/ML; MG/ML
4 SUSPENSION/ DROPS AURICULAR (OTIC) 2 TIMES DAILY
Qty: 7.5 ML | Refills: 0 | Status: SHIPPED | OUTPATIENT
Start: 2021-10-14 | End: 2022-04-25 | Stop reason: ALTCHOICE

## 2021-10-14 RX ORDER — AMOXICILLIN AND CLAVULANATE POTASSIUM 875; 125 MG/1; MG/1
1 TABLET, FILM COATED ORAL 2 TIMES DAILY
Qty: 20 TABLET | Refills: 0 | Status: SHIPPED | OUTPATIENT
Start: 2021-10-14 | End: 2021-10-24

## 2021-10-14 ASSESSMENT — ENCOUNTER SYMPTOMS
RECTAL PAIN: 1
RHINORRHEA: 0
SORE THROAT: 0
SINUS PAIN: 0

## 2021-10-14 NOTE — PATIENT INSTRUCTIONS
WATER PRECAUTIONS  · Do not allow water to get into your right or left ears, as this may cause, or worsen, an ear infection. · Before bathing, showering or washing your hair, a plug of cotton coated with petroleum jelly should be placed in the opening of your ear canal.  After bathing the cotton should be removed and the outer part of your ear dried with a soft towel. Alternatively, you may use a silicone putty ear plug purchased from your pharmacy. · If water does enter your ear, drain the water out into a tissue or cotton ball. Then, instill into your ear four drops of the eardrops you were prescribed. Call the office if you develop develops pain or drainage.

## 2021-10-14 NOTE — PROGRESS NOTES
[facility]         PCP:  Maximino Hamman, MD      [facility]  Chief Complaint   Patient presents with    Ear Problem     left side        HISTORY OF PRESENT ILLNESS       Khalif Choi is a 39 y.o. male here for evaluation and treatment of a left ear problem. Left ear drainage since Sept 25 first noticed sounded like a fluid type sound , hearing sounds a little louder, feels like pressure changed in my right ear. Momentary discomfort off and on during this past weelk. I had some head congestion the second week of Sept for about one week and then it cleared up. REVIEW OF SYSTEMS   Review of Systems   Constitutional: Negative for chills and fever. HENT: Positive for ear discharge (left ear) and ear pain (occasonal, brief, and intermittent sensation like your ear wants to pop). Negative for congestion, rhinorrhea, sinus pain and sore throat. Gastrointestinal: Positive for rectal pain. PAST MEDICAL HISTORY    Past Medical History:   Diagnosis Date    Acute otitis externa of right ear     Hypertension        Past Surgical History:   Procedure Laterality Date    INNER EAR SURGERY      right    TYMPANOPLASTY      right    TYMPANOPLASTY Right 03/30/2017    RIGHT TYMPANOPLASTY WITH MASTOIDECTOMY         EXAMINATION    Vitals:    10/14/21 1528   BP: (!) 152/94   Site: Left Upper Arm   Position: Sitting   Cuff Size: Medium Adult   Pulse: 65   Temp: 97.3 °F (36.3 °C)   TempSrc: Temporal       Physical Exam  Vitals reviewed. Constitutional:       General: He is not in acute distress. Appearance: Normal appearance. He is not ill-appearing. HENT:      Head: Normocephalic. Right Ear: There is no impacted cerumen. Left Ear: There is no impacted cerumen.       Ears:      Comments: Bilateral otomicroscopy:  Left ear TM dull, thick, erythematous, with a central perforation and granulation tissue at anterior lip of perforation. There was copious squamous debris and cerumen impaction removed with suction. Cipro drops instilled and CB plug placed. EAC with erythema and edema. Right ear status quo. Nose: Nose normal. No congestion or rhinorrhea. Mouth/Throat:      Mouth: Mucous membranes are moist.      Pharynx: Oropharynx is clear. No oropharyngeal exudate or posterior oropharyngeal erythema. Neck:      Comments: No cervical masses or tenderness. Musculoskeletal:      Cervical back: No tenderness. Lymphadenopathy:      Cervical: No cervical adenopathy. Neurological:      Mental Status: He is alert. Left ear TM dull thid erythema with perforation and granulation tissue at anterior dennise=ip of perforation. copious squamous debris and cerumen impaction removed with suction. Cipro drops instilled and CB plug placed. EAC sith erythema and edema. Right ear status quo. Tamiko Belcher / Sherin Snellen / Libia Segura was seen today for ear problem. Diagnoses and all orders for this visit:    Recurrent acute suppurative otitis media with spontaneous rupture of left tympanic membrane  -     ciprofloxacin-dexamethasone (CIPRODEX) 0.3-0.1 % otic suspension; Place 4 drops into the left ear 2 times daily  -     amoxicillin-clavulanate (AUGMENTIN) 875-125 MG per tablet; Take 1 tablet by mouth 2 times daily for 10 days    Perforation of left tympanic membrane    Acute swimmer's ear of left side  -     ciprofloxacin-dexamethasone (CIPRODEX) 0.3-0.1 % otic suspension; Place 4 drops into the left ear 2 times daily    Chronic tubotympanic suppurative otitis media of both ears             RECOMMENDATIONS/PLAN        1. See patient instructions on file for this visit. 2. Acetaminophen (eg. Tylenol) or Ibuprofen (eg. Advil) (over the counter medications) as needed for pain. 3. Return in about 11 days (around 10/25/2021) for recheck/follow-up, and sooner if condition worsens.         Patient Instructions WATER PRECAUTIONS  · Do not allow water to get into your right or left ears, as this may cause, or worsen, an ear infection. · Before bathing, showering or washing your hair, a plug of cotton coated with petroleum jelly should be placed in the opening of your ear canal.  After bathing the cotton should be removed and the outer part of your ear dried with a soft towel. Alternatively, you may use a silicone putty ear plug purchased from your pharmacy. · If water does enter your ear, drain the water out into a tissue or cotton ball. Then, instill into your ear four drops of the eardrops you were prescribed. Call the office if you develop develops pain or drainage.

## 2021-10-25 ENCOUNTER — OFFICE VISIT (OUTPATIENT)
Dept: ENT CLINIC | Age: 41
End: 2021-10-25
Payer: COMMERCIAL

## 2021-10-25 VITALS — TEMPERATURE: 97.5 F | HEART RATE: 69 BPM | DIASTOLIC BLOOD PRESSURE: 83 MMHG | SYSTOLIC BLOOD PRESSURE: 132 MMHG

## 2021-10-25 DIAGNOSIS — H66.12 CHRONIC TUBOTYMPANIC SUPPURATIVE OTITIS MEDIA, LEFT EAR: Chronic | ICD-10-CM

## 2021-10-25 DIAGNOSIS — H72.92 PERFORATION OF LEFT TYMPANIC MEMBRANE: Chronic | ICD-10-CM

## 2021-10-25 DIAGNOSIS — H60.332 ACUTE SWIMMER'S EAR OF LEFT SIDE: ICD-10-CM

## 2021-10-25 DIAGNOSIS — H66.012 ACUTE SUPPR OTITIS MEDIA W SPON RUPT EAR DRUM, LEFT EAR: Primary | ICD-10-CM

## 2021-10-25 PROCEDURE — 99213 OFFICE O/P EST LOW 20 MIN: CPT | Performed by: OTOLARYNGOLOGY

## 2021-10-25 NOTE — PROGRESS NOTES
[facility]       PCP:  Meliton Horner MD      [address] [facility]  Chief Complaint   Patient presents with    Follow-up     Recurrent acute suppurative otitis media with spontaneous rupture of left tympanic membrane       HISTORY OF PRESENT ILLNESS       Sohail Jesus is a 39 y.o. male here for recheck and follow up of right ear infection. Ear stopped draining about three days after starting that antibiotic and ear drops. PAST MEDICAL HISTORY    Past Medical History:   Diagnosis Date    Acute otitis externa of right ear     Hypertension        Past Surgical History:   Procedure Laterality Date    INNER EAR SURGERY      right    TYMPANOPLASTY      right    TYMPANOPLASTY Right 03/30/2017    RIGHT TYMPANOPLASTY WITH MASTOIDECTOMY         EXAMINATION    Vitals:    10/25/21 1308   BP: 132/83   Site: Left Upper Arm   Position: Sitting   Cuff Size: Medium Adult   Pulse: 69   Temp: 97.5 °F (36.4 °C)   TempSrc: Temporal       Physical Exam  Vitals reviewed. Constitutional:       General: He is not in acute distress. Appearance: Normal appearance. He is not ill-appearing. HENT:      Head: Normocephalic. Left Ear: Ear canal and external ear normal.      Ears:      Comments: Left TM dull and thick with some granulation tissue but much improved and essentially resolved. Neck:      Comments: No cervical masses or tenderness. Neurological:      Mental Status: He is alert. Chiki Gramajo / Slivia Hyde / Ajith Amaya was seen today for follow-up. Diagnoses and all orders for this visit:    Acute suppr otitis media w spon rupt ear drum, left ear  Comments:  Resolved. Acute swimmer's ear of left side  Comments:  resolved    Perforation of left tympanic membrane    Chronic tubotympanic suppurative otitis media, left ear         RECOMMENDATIONS/PLAN      1. Acetaminophen (eg. Tylenol) or Ibuprofen (eg.  Advil) (over the counter medications) as needed for pain. 2. Return in about 6 months (around 4/25/2022) for recheck/follow-up, and sooner if condition worsens, or other ENT problems. Bryan Mo

## 2022-04-25 ENCOUNTER — OFFICE VISIT (OUTPATIENT)
Dept: ENT CLINIC | Age: 42
End: 2022-04-25
Payer: COMMERCIAL

## 2022-04-25 VITALS
HEART RATE: 56 BPM | OXYGEN SATURATION: 96 % | SYSTOLIC BLOOD PRESSURE: 149 MMHG | TEMPERATURE: 97.5 F | DIASTOLIC BLOOD PRESSURE: 86 MMHG

## 2022-04-25 DIAGNOSIS — H66.13 CHRONIC TUBOTYMPANIC SUPPURATIVE OTITIS MEDIA OF BOTH EARS: Primary | ICD-10-CM

## 2022-04-25 DIAGNOSIS — H70.11 CHRONIC MASTOIDITIS OF RIGHT SIDE: ICD-10-CM

## 2022-04-25 DIAGNOSIS — H61.22 IMPACTED CERUMEN OF LEFT EAR: ICD-10-CM

## 2022-04-25 DIAGNOSIS — H95.191 ENCOUNTER FOR MASTOIDECTOMY CAVITY DEBRIDEMENT, RIGHT: ICD-10-CM

## 2022-04-25 DIAGNOSIS — H72.92 PERFORATION OF LEFT TYMPANIC MEMBRANE: ICD-10-CM

## 2022-04-25 DIAGNOSIS — H90.A12 CONDUCTIVE HEARING LOSS OF LEFT EAR WITH RESTRICTED HEARING OF RIGHT EAR: ICD-10-CM

## 2022-04-25 PROCEDURE — 69210 REMOVE IMPACTED EAR WAX UNI: CPT | Performed by: OTOLARYNGOLOGY

## 2022-04-25 PROCEDURE — 69222 CLEAN OUT MASTOID CAVITY: CPT | Performed by: OTOLARYNGOLOGY

## 2022-04-25 NOTE — PROGRESS NOTES
[address] [place] [address] ENT    HISTORY:  Florencio Casas presented today for debridement of the right mastoid bowl and EAC and for removal of cerumen impaction from the left EAC. He denied pain or drainage from either ear. Debrided right mastoid, cleaned left EAC     PROCEDURE ([id number]):  Debridement of the right mastoid cavity/bowl, was performed under otomicroscopic visualization, removing copious squamous debris and cerumen impaction. Florencio Casas stated that the debrided ear felt back to usual after the procedure. PROCEDURE - REMOVAL OF CERUMEN IMPACTION LEFT EAR ([id number]):  Obstructing cerumen impaction occluding the left EAC  and obscuring visualization of the left tympanic membrane, was removed under otomicroscopic visualization, with instrumentation, using a Billeau wire loop  Under otomicroscopic examination, the left EAC appeared to be normal.  The left TM appeared to be unchanged since previous examination, with persistent attic perforation/retraction pocket and no definite evidence of cholesteatoma. Florencio Casas reported improved hearing, back to usual level, in the left and right ears after cerumen removal and replacement of hearing aids. Mikaela Fontenot / Claus Leos / Gulshan De Leon / PROCEDURES:       Florencio Casas was seen today for follow-up. Diagnoses and all orders for this visit:    Chronic tubotympanic suppurative otitis media of both ears    Chronic mastoiditis of right side    Encounter for mastoidectomy cavity debridement, right    Impacted cerumen of left ear    Conductive hearing loss of left ear with restricted hearing of right ear    Perforation of left tympanic membrane           RECOMMENDATIONS / PLAN:   Return in about 6 months (around 10/25/2022) for recheck, debride right mastoid cavity and clean left EAC, and sooner if condition worsens.

## 2022-10-26 ENCOUNTER — OFFICE VISIT (OUTPATIENT)
Dept: ENT CLINIC | Age: 42
End: 2022-10-26
Payer: COMMERCIAL

## 2022-10-26 VITALS — HEART RATE: 68 BPM | SYSTOLIC BLOOD PRESSURE: 135 MMHG | TEMPERATURE: 97.3 F | DIASTOLIC BLOOD PRESSURE: 83 MMHG

## 2022-10-26 DIAGNOSIS — H70.11 CHRONIC MASTOIDITIS OF RIGHT SIDE: ICD-10-CM

## 2022-10-26 DIAGNOSIS — H66.13 CHRONIC TUBOTYMPANIC SUPPURATIVE OTITIS MEDIA OF BOTH EARS: Primary | ICD-10-CM

## 2022-10-26 DIAGNOSIS — H90.A12 CONDUCTIVE HEARING LOSS OF LEFT EAR WITH RESTRICTED HEARING OF RIGHT EAR: ICD-10-CM

## 2022-10-26 DIAGNOSIS — H61.22 IMPACTED CERUMEN OF LEFT EAR: ICD-10-CM

## 2022-10-26 DIAGNOSIS — H72.92 PERFORATION OF LEFT TYMPANIC MEMBRANE: ICD-10-CM

## 2022-10-26 DIAGNOSIS — H95.191 ENCOUNTER FOR MASTOIDECTOMY CAVITY DEBRIDEMENT, RIGHT: ICD-10-CM

## 2022-10-26 PROCEDURE — 69220 CLEAN OUT MASTOID CAVITY: CPT | Performed by: OTOLARYNGOLOGY

## 2022-10-26 PROCEDURE — 69210 REMOVE IMPACTED EAR WAX UNI: CPT | Performed by: OTOLARYNGOLOGY

## 2022-10-26 NOTE — PROGRESS NOTES
[address] [place] [address] ENT    HISTORY:  Abbie Brady presented today for debridement of the right mastoid bowl and EAC and for removal of cerumen impaction from the left EAC. He denied pain or drainage from either ear. PROCEDURE ([id number]):  Debridement of the right mastoid cavity/bowl, was performed under otomicroscopic visualization, removing copious squamous debris and cerumen impaction. Tympanic membrane appeared to be intact. There was no evidence of acute infection. Abbie Brady stated that the debrided ear felt back to usual after the procedure. PROCEDURE - REMOVAL OF CERUMEN IMPACTION LEFT EAR ([id number]):  Obstructing cerumen impaction occluding the left EAC  and obscuring visualization of the left tympanic membrane, was removed under otomicroscopic visualization, with instrumentation, using a Billeau wire loop  Under otomicroscopic examination, the left EAC appeared to be normal.  The left TM appeared to be unchanged since previous examination, with persistent attic perforation and no evidence of cholesteatoma. Abbie Brady reported improved hearing, back to usual level, in the left and right ears after cerumen removal and replacement of hearing aids. IMPRESSION / Gracy Christensen / Roni Tai / PROCEDURES:       Abbie Brady was seen today for ear problem. Diagnoses and all orders for this visit:    Chronic tubotympanic suppurative otitis media of both ears    Chronic mastoiditis of right side    Encounter for mastoidectomy cavity debridement, right    Impacted cerumen of left ear    Conductive hearing loss of left ear with restricted hearing of right ear    Perforation of left tympanic membrane         RECOMMENDATIONS / PLAN:   Return in about 6 months (around 4/26/2023) for recheck, debridement right mastoid bowl, and cleaning of left ear, and sooner if condition worsens.

## 2023-04-26 ENCOUNTER — OFFICE VISIT (OUTPATIENT)
Dept: ENT CLINIC | Age: 43
End: 2023-04-26
Payer: COMMERCIAL

## 2023-04-26 VITALS
BODY MASS INDEX: 29.68 KG/M2 | SYSTOLIC BLOOD PRESSURE: 135 MMHG | HEIGHT: 71 IN | TEMPERATURE: 98.6 F | HEART RATE: 66 BPM | OXYGEN SATURATION: 97 % | DIASTOLIC BLOOD PRESSURE: 87 MMHG | WEIGHT: 212 LBS

## 2023-04-26 DIAGNOSIS — H90.A12 CONDUCTIVE HEARING LOSS OF LEFT EAR WITH RESTRICTED HEARING OF RIGHT EAR: ICD-10-CM

## 2023-04-26 DIAGNOSIS — H61.22 IMPACTED CERUMEN OF LEFT EAR: ICD-10-CM

## 2023-04-26 DIAGNOSIS — H66.13 CHRONIC TUBOTYMPANIC SUPPURATIVE OTITIS MEDIA OF BOTH EARS: Primary | ICD-10-CM

## 2023-04-26 DIAGNOSIS — H72.92 PERFORATION OF LEFT TYMPANIC MEMBRANE: ICD-10-CM

## 2023-04-26 DIAGNOSIS — H70.11 CHRONIC MASTOIDITIS OF RIGHT SIDE: ICD-10-CM

## 2023-04-26 DIAGNOSIS — H95.191 ENCOUNTER FOR MASTOIDECTOMY CAVITY DEBRIDEMENT, RIGHT: ICD-10-CM

## 2023-04-26 PROCEDURE — 69220 CLEAN OUT MASTOID CAVITY: CPT | Performed by: OTOLARYNGOLOGY

## 2023-04-26 PROCEDURE — 69210 REMOVE IMPACTED EAR WAX UNI: CPT | Performed by: OTOLARYNGOLOGY

## 2023-04-26 RX ORDER — GUAIFENESIN 400 MG/1
400 TABLET ORAL 4 TIMES DAILY PRN
COMMUNITY

## 2023-04-26 RX ORDER — BENZONATATE 100 MG/1
100 CAPSULE ORAL 3 TIMES DAILY PRN
COMMUNITY

## 2023-10-26 ENCOUNTER — OFFICE VISIT (OUTPATIENT)
Dept: ENT CLINIC | Age: 43
End: 2023-10-26

## 2023-10-26 VITALS
BODY MASS INDEX: 29.6 KG/M2 | WEIGHT: 211.4 LBS | SYSTOLIC BLOOD PRESSURE: 164 MMHG | TEMPERATURE: 97.6 F | OXYGEN SATURATION: 98 % | DIASTOLIC BLOOD PRESSURE: 92 MMHG | HEIGHT: 71 IN | HEART RATE: 78 BPM

## 2023-10-26 DIAGNOSIS — H70.11 CHRONIC MASTOIDITIS OF RIGHT SIDE: Chronic | ICD-10-CM

## 2023-10-26 DIAGNOSIS — H66.13 CHRONIC TUBOTYMPANIC SUPPURATIVE OTITIS MEDIA OF BOTH EARS: Primary | Chronic | ICD-10-CM

## 2023-10-26 DIAGNOSIS — H95.191 ENCOUNTER FOR MASTOIDECTOMY CAVITY DEBRIDEMENT, RIGHT: ICD-10-CM

## 2023-10-26 DIAGNOSIS — H61.22 IMPACTED CERUMEN OF LEFT EAR: ICD-10-CM

## 2023-10-26 NOTE — PROGRESS NOTES
[facility] ENT      Chief Complaint   Patient presents with    Ear Problem       HISTORY:  [employer] is today for debridement of his right mastoid bowl and cleaning of his left external auditory canal cerumen impaction. He wears a hearing aid in both ears. He has very little hearing in the right ear but the hearing aid does help some. His left ear is his only truly functional hearing ear. He has had chronic disease in that ear with attic retraction pocket but no development of cholesteatoma. Denied any current drainage from either ear. He stated his hearing seems to be decreased in the left ear from usual.        PROCEDURE #1 - DEBRIDEMENT OF RIGHT MASTOID BOWL ([id number]):  Debridement of the right mastoid cavity/bowl, was performed under otomicroscopic visualization, removing copious squamous debris and cerumen impaction. There were post surgical changes of the TM and EAC and a mastoid bowl. Patient stated that the debrided ear felt back to usual and the hearing in that ear seemed to be improved after the procedure. PROCEDURE #2 - REMOVAL OF CERUMEN IMPACTION LEFT EAR ([id number])  Obstructing cerumen impaction occluding the left EAC  and obscuring visualization of the left tympanic membrane, was removed under otomicroscopic visualization, with instrumentation, using a Billeau wire loop. Under otomicroscopic examination, the left EAC appeared to be normal.  The left TM was dull and thickened with an attic perforation with no evidence of cholesteatoma. [employer] reported improved hearing, back to usual level, after cerumen removal, and replacement of hearing aids. IMPRESSION / Wendy Gong / Clemetine Snooks / PROCEDURES:       SammEnrique Julio was seen today for ear problem.     Diagnoses and all orders for this visit:    Chronic tubotympanic suppurative otitis media of both ears    Chronic mastoiditis of right side    Encounter for mastoidectomy cavity debridement, right    Impacted cerumen of left

## 2024-04-18 ENCOUNTER — OFFICE VISIT (OUTPATIENT)
Dept: ENT CLINIC | Age: 44
End: 2024-04-18

## 2024-04-18 VITALS
HEIGHT: 71 IN | SYSTOLIC BLOOD PRESSURE: 123 MMHG | BODY MASS INDEX: 29.4 KG/M2 | HEART RATE: 65 BPM | WEIGHT: 210 LBS | OXYGEN SATURATION: 96 % | DIASTOLIC BLOOD PRESSURE: 78 MMHG | TEMPERATURE: 98 F

## 2024-04-18 DIAGNOSIS — H95.191 ENCOUNTER FOR MASTOIDECTOMY CAVITY DEBRIDEMENT, RIGHT: ICD-10-CM

## 2024-04-18 DIAGNOSIS — H72.92 PERFORATION OF LEFT TYMPANIC MEMBRANE: ICD-10-CM

## 2024-04-18 DIAGNOSIS — H66.13 CHRONIC TUBOTYMPANIC SUPPURATIVE OTITIS MEDIA OF BOTH EARS: Primary | ICD-10-CM

## 2024-04-18 DIAGNOSIS — H70.11 CHRONIC MASTOIDITIS OF RIGHT SIDE: ICD-10-CM

## 2024-04-18 DIAGNOSIS — H61.22 IMPACTED CERUMEN OF LEFT EAR: ICD-10-CM

## 2024-04-18 NOTE — PROGRESS NOTES
[facility]YSIC[facility]   [facility]    Chief Complaint   Patient presents with    Ear Problem       HISTORY:  Clive stated that the right mastoid bowl needs cleaning and the left ear is plugged up and the hearing is decreased in that ear.  There is no pain in or drainage from that ear.        PROCEDURE (96672):  Debridement of the right mastoid cavity/bowl, was performed under otomicroscopic visualization, using a Billeau wire loop, Cox ear suction and alligator forceps, removing copious squamous debris and cerumen impaction.  Patient stated that the debrided ear felt back to usual after the procedure.        PROCEDURE - REMOVAL OF CERUMEN IMPACTION (22550)  Obstructing cerumen impaction occluding the left EAC and obscuring visualization of the left tympanic membrane was removed under otomicroscopic visualization, with instrumentation, using a Billeau wire loop and Cox ear suction.  Under otomicroscopic examination, the left EAC  appeared to be normal.  The left TM was dull and thickened with an attic perforation with no evidence of cholesteatoma.          IMPRESSION / DIAGNOSES / ORDERS / PROCEDURES:       Clive was seen today for ear problem.    Diagnoses and all orders for this visit:    Chronic tubotympanic suppurative otitis media of both ears    Chronic mastoiditis of right side    Encounter for mastoidectomy cavity debridement, right    Perforation of left tympanic membrane    Impacted cerumen of left ear         RECOMMENDATIONS / PLAN:   Return in about 6 months (around 10/18/2024) for right mastoid debridement and cleaning of left ear .

## 2024-10-21 ENCOUNTER — OFFICE VISIT (OUTPATIENT)
Dept: ENT CLINIC | Age: 44
End: 2024-10-21

## 2024-10-21 VITALS
OXYGEN SATURATION: 98 % | HEART RATE: 54 BPM | BODY MASS INDEX: 29.96 KG/M2 | SYSTOLIC BLOOD PRESSURE: 138 MMHG | DIASTOLIC BLOOD PRESSURE: 89 MMHG | HEIGHT: 71 IN | WEIGHT: 214 LBS | TEMPERATURE: 98.1 F

## 2024-10-21 DIAGNOSIS — H70.11 CHRONIC MASTOIDITIS OF RIGHT SIDE: Primary | ICD-10-CM

## 2024-10-21 DIAGNOSIS — H90.A12 CONDUCTIVE HEARING LOSS OF LEFT EAR WITH RESTRICTED HEARING OF RIGHT EAR: ICD-10-CM

## 2024-10-21 DIAGNOSIS — H61.22 IMPACTED CERUMEN OF LEFT EAR: ICD-10-CM

## 2024-10-21 DIAGNOSIS — H72.92 PERFORATION OF LEFT TYMPANIC MEMBRANE: ICD-10-CM

## 2024-10-21 DIAGNOSIS — H66.13 CHRONIC TUBOTYMPANIC SUPPURATIVE OTITIS MEDIA OF BOTH EARS: ICD-10-CM

## 2024-10-21 DIAGNOSIS — H95.191 ENCOUNTER FOR MASTOIDECTOMY CAVITY DEBRIDEMENT, RIGHT: ICD-10-CM

## 2024-10-21 NOTE — PROGRESS NOTES
debridement, right    Chronic tubotympanic suppurative otitis media of both ears    Impacted cerumen of left ear    Conductive hearing loss of left ear with restricted hearing of right ear    Perforation of left tympanic membrane         RECOMMENDATIONS / PLAN:   Return in about 6 months (around 4/21/2025) for debridement of right mastoid bowl and cleaning of left EAC, recheck/follow-up after hearing test.